# Patient Record
Sex: FEMALE | Race: BLACK OR AFRICAN AMERICAN | NOT HISPANIC OR LATINO | Employment: FULL TIME | URBAN - METROPOLITAN AREA
[De-identification: names, ages, dates, MRNs, and addresses within clinical notes are randomized per-mention and may not be internally consistent; named-entity substitution may affect disease eponyms.]

---

## 2020-02-23 ENCOUNTER — HOSPITAL ENCOUNTER (EMERGENCY)
Facility: HOSPITAL | Age: 40
Discharge: HOME | End: 2020-02-24
Attending: EMERGENCY MEDICINE
Payer: COMMERCIAL

## 2020-02-23 DIAGNOSIS — R10.13 EPIGASTRIC ABDOMINAL PAIN: Primary | ICD-10-CM

## 2020-02-23 DIAGNOSIS — R11.2 NON-INTRACTABLE VOMITING WITH NAUSEA, UNSPECIFIED VOMITING TYPE: ICD-10-CM

## 2020-02-23 LAB
ALBUMIN SERPL BCP-MCNC: 4.7 G/DL (ref 3.5–5.2)
ALP SERPL-CCNC: 68 U/L (ref 55–135)
ALT SERPL W/O P-5'-P-CCNC: 33 U/L (ref 10–44)
ANION GAP SERPL CALC-SCNC: 13 MMOL/L (ref 8–16)
AST SERPL-CCNC: 43 U/L (ref 10–40)
B-HCG UR QL: NEGATIVE
BACTERIA #/AREA URNS HPF: ABNORMAL /HPF
BASOPHILS # BLD AUTO: 0.04 K/UL (ref 0–0.2)
BASOPHILS NFR BLD: 0.2 % (ref 0–1.9)
BILIRUB SERPL-MCNC: 0.4 MG/DL (ref 0.1–1)
BILIRUB UR QL STRIP: NEGATIVE
BUN SERPL-MCNC: 15 MG/DL (ref 6–20)
CALCIUM SERPL-MCNC: 9.4 MG/DL (ref 8.7–10.5)
CHLORIDE SERPL-SCNC: 108 MMOL/L (ref 95–110)
CLARITY UR: CLEAR
CO2 SERPL-SCNC: 17 MMOL/L (ref 23–29)
COLOR UR: YELLOW
CREAT SERPL-MCNC: 1 MG/DL (ref 0.5–1.4)
CTP QC/QA: YES
DIFFERENTIAL METHOD: ABNORMAL
EOSINOPHIL # BLD AUTO: 0 K/UL (ref 0–0.5)
EOSINOPHIL NFR BLD: 0 % (ref 0–8)
ERYTHROCYTE [DISTWIDTH] IN BLOOD BY AUTOMATED COUNT: 13.1 % (ref 11.5–14.5)
EST. GFR  (AFRICAN AMERICAN): >60 ML/MIN/1.73 M^2
EST. GFR  (NON AFRICAN AMERICAN): >60 ML/MIN/1.73 M^2
GLUCOSE SERPL-MCNC: 198 MG/DL (ref 70–110)
GLUCOSE UR QL STRIP: ABNORMAL
HCT VFR BLD AUTO: 40.9 % (ref 37–48.5)
HGB BLD-MCNC: 12.8 G/DL (ref 12–16)
HGB UR QL STRIP: ABNORMAL
HYALINE CASTS #/AREA URNS LPF: 0 /LPF
IMM GRANULOCYTES # BLD AUTO: 0.18 K/UL (ref 0–0.04)
IMM GRANULOCYTES NFR BLD AUTO: 0.8 % (ref 0–0.5)
KETONES UR QL STRIP: ABNORMAL
LEUKOCYTE ESTERASE UR QL STRIP: NEGATIVE
LIPASE SERPL-CCNC: 16 U/L (ref 4–60)
LYMPHOCYTES # BLD AUTO: 1.2 K/UL (ref 1–4.8)
LYMPHOCYTES NFR BLD: 5.4 % (ref 18–48)
MCH RBC QN AUTO: 28.3 PG (ref 27–31)
MCHC RBC AUTO-ENTMCNC: 31.3 G/DL (ref 32–36)
MCV RBC AUTO: 90 FL (ref 82–98)
MICROSCOPIC COMMENT: ABNORMAL
MONOCYTES # BLD AUTO: 0.9 K/UL (ref 0.3–1)
MONOCYTES NFR BLD: 4.3 % (ref 4–15)
NEUTROPHILS # BLD AUTO: 19.4 K/UL (ref 1.8–7.7)
NEUTROPHILS NFR BLD: 89.3 % (ref 38–73)
NITRITE UR QL STRIP: NEGATIVE
NRBC BLD-RTO: 0 /100 WBC
PH UR STRIP: 5 [PH] (ref 5–8)
PLATELET # BLD AUTO: 308 K/UL (ref 150–350)
PMV BLD AUTO: 9.9 FL (ref 9.2–12.9)
POTASSIUM SERPL-SCNC: 4.3 MMOL/L (ref 3.5–5.1)
PROT SERPL-MCNC: 8.7 G/DL (ref 6–8.4)
PROT UR QL STRIP: ABNORMAL
RBC # BLD AUTO: 4.53 M/UL (ref 4–5.4)
RBC #/AREA URNS HPF: 2 /HPF (ref 0–4)
SODIUM SERPL-SCNC: 138 MMOL/L (ref 136–145)
SP GR UR STRIP: 1.02 (ref 1–1.03)
URN SPEC COLLECT METH UR: ABNORMAL
UROBILINOGEN UR STRIP-ACNC: NEGATIVE EU/DL
WBC # BLD AUTO: 21.76 K/UL (ref 3.9–12.7)
WBC #/AREA URNS HPF: 2 /HPF (ref 0–5)

## 2020-02-23 PROCEDURE — 83690 ASSAY OF LIPASE: CPT

## 2020-02-23 PROCEDURE — 85025 COMPLETE CBC W/AUTO DIFF WBC: CPT

## 2020-02-23 PROCEDURE — 99284 EMERGENCY DEPT VISIT MOD MDM: CPT | Mod: 25

## 2020-02-23 PROCEDURE — 81025 URINE PREGNANCY TEST: CPT | Performed by: EMERGENCY MEDICINE

## 2020-02-23 PROCEDURE — 96374 THER/PROPH/DIAG INJ IV PUSH: CPT

## 2020-02-23 PROCEDURE — 81000 URINALYSIS NONAUTO W/SCOPE: CPT

## 2020-02-23 PROCEDURE — 96361 HYDRATE IV INFUSION ADD-ON: CPT

## 2020-02-23 PROCEDURE — 80053 COMPREHEN METABOLIC PANEL: CPT

## 2020-02-23 PROCEDURE — 63600175 PHARM REV CODE 636 W HCPCS: Performed by: NURSE PRACTITIONER

## 2020-02-23 RX ORDER — ONDANSETRON 2 MG/ML
8 INJECTION INTRAMUSCULAR; INTRAVENOUS
Status: COMPLETED | OUTPATIENT
Start: 2020-02-23 | End: 2020-02-23

## 2020-02-23 RX ADMIN — SODIUM CHLORIDE 1000 ML: 0.9 INJECTION, SOLUTION INTRAVENOUS at 11:02

## 2020-02-23 RX ADMIN — ONDANSETRON HYDROCHLORIDE 8 MG: 2 SOLUTION INTRAMUSCULAR; INTRAVENOUS at 11:02

## 2020-02-24 VITALS
WEIGHT: 150 LBS | TEMPERATURE: 98 F | BODY MASS INDEX: 24.11 KG/M2 | HEART RATE: 88 BPM | RESPIRATION RATE: 18 BRPM | DIASTOLIC BLOOD PRESSURE: 74 MMHG | SYSTOLIC BLOOD PRESSURE: 129 MMHG | HEIGHT: 66 IN | OXYGEN SATURATION: 98 %

## 2020-02-24 PROCEDURE — 25500020 PHARM REV CODE 255: Performed by: NURSE PRACTITIONER

## 2020-02-24 PROCEDURE — 63600175 PHARM REV CODE 636 W HCPCS: Performed by: NURSE PRACTITIONER

## 2020-02-24 RX ORDER — PANTOPRAZOLE SODIUM 20 MG/1
20 TABLET, DELAYED RELEASE ORAL DAILY
Qty: 30 TABLET | Refills: 0 | Status: SHIPPED | OUTPATIENT
Start: 2020-02-24 | End: 2021-02-23

## 2020-02-24 RX ORDER — ONDANSETRON 4 MG/1
4 TABLET, ORALLY DISINTEGRATING ORAL EVERY 6 HOURS PRN
Qty: 20 TABLET | Refills: 0 | Status: SHIPPED | OUTPATIENT
Start: 2020-02-24

## 2020-02-24 RX ADMIN — IOHEXOL 80 ML: 350 INJECTION, SOLUTION INTRAVENOUS at 12:02

## 2020-02-24 RX ADMIN — SODIUM CHLORIDE 1000 ML: 0.9 INJECTION, SOLUTION INTRAVENOUS at 12:02

## 2020-02-24 NOTE — ED PROVIDER NOTES
"Encounter Date: 2/23/2020    SCRIBE #1 NOTE: I, Jennifercandice Lemus, am scribing for, and in the presence of,  Fransico Kong NP. I have scribed the following portions of the note - Other sections scribed: HPI, ROS, PE.       History     Chief Complaint   Patient presents with    Vomiting     pt reports drinking alcohol yesterday and now today she has been nauseated & vomiting; pt states "I can't keep anything down"; no meds taken for symptoms    Nausea     Time of initial assessment: 10:49 PM    CC: Emesis    HPI:  This is a 39 y.o. Female, with no known PMHx, who presents to the Emergency Department with a cc of alcohol induced emesis that began this morning upon waking up. Pt reports that she is in town from [place] visiting for [place] and consumed an excessive amount of alcohol last night. Associated symptoms include inability to retain food, abdominal pain, nausea, and diarrhea. Pt says she is unable to retain anything, including water. "Everything makes me feel like I'm going to throw up." She describes her abdominal pain as a soreness that can probably be attributed to abdominal muscle contraction while vomiting. She denies a history of abdominal surgeries. No known allergies. She denies taking any daily medications. No alleviating factors reported. No prior tx.    The history is provided by the patient. No  was used.     Review of patient's allergies indicates:  No Known Allergies  Past Medical History:   Diagnosis Date    Hypertension      Past Surgical History:   Procedure Laterality Date    TUBAL LIGATION       History reviewed. No pertinent family history.  Social History     Tobacco Use    Smoking status: Never Smoker   Substance Use Topics    Alcohol use: Yes     Comment: Occasionally    Drug use: Yes     Types: Marijuana     Review of Systems   Constitutional: Negative for chills and fever.        (+) inability to retain food   HENT: Negative for congestion and sore throat.  "   Respiratory: Negative for shortness of breath.    Cardiovascular: Negative for chest pain.   Gastrointestinal: Positive for abdominal pain, diarrhea, nausea and vomiting.   Genitourinary: Negative for dysuria.   Musculoskeletal: Negative for myalgias.   Skin: Negative for rash.   Neurological: Negative for seizures and syncope.   Hematological: Does not bruise/bleed easily.   Psychiatric/Behavioral: Negative for confusion.       Physical Exam     Initial Vitals [02/23/20 2149]   BP Pulse Resp Temp SpO2   132/79 94 17 98.3 °F (36.8 °C) 98 %      MAP       --         Physical Exam    Nursing note and vitals reviewed.  Constitutional: She appears well-developed and well-nourished. She is not diaphoretic. No distress.   HENT:   Head: Normocephalic and atraumatic.   Right Ear: External ear normal.   Left Ear: External ear normal.   Mouth/Throat: Oropharynx is clear and moist. No oropharyngeal exudate.   Eyes: Conjunctivae and EOM are normal. Right eye exhibits no discharge. Left eye exhibits no discharge.   Neck: Normal range of motion. Neck supple. No tracheal deviation present.   Cardiovascular: Normal rate.   Pulmonary/Chest: No stridor. No respiratory distress.   Abdominal: Soft. She exhibits no distension. There is generalized tenderness. There is no rigidity, no rebound, no guarding, no CVA tenderness, no tenderness at McBurney's point and negative Khalil's sign.   Mild generalized abdominal tenderness.  No focal tenderness.  No rigidity or guarding.   Musculoskeletal: Normal range of motion. She exhibits no tenderness.   Neurological: She is alert and oriented to person, place, and time. No cranial nerve deficit or sensory deficit.   Skin: Skin is warm and dry.   Psychiatric: She has a normal mood and affect. Her behavior is normal. Judgment and thought content normal.         ED Course   Procedures  Labs Reviewed   CBC W/ AUTO DIFFERENTIAL - Abnormal; Notable for the following components:       Result Value     WBC 21.76 (*)     Mean Corpuscular Hemoglobin Conc 31.3 (*)     Immature Granulocytes 0.8 (*)     Gran # (ANC) 19.4 (*)     Immature Grans (Abs) 0.18 (*)     Gran% 89.3 (*)     Lymph% 5.4 (*)     All other components within normal limits   COMPREHENSIVE METABOLIC PANEL - Abnormal; Notable for the following components:    CO2 17 (*)     Glucose 198 (*)     Total Protein 8.7 (*)     AST 43 (*)     All other components within normal limits   URINALYSIS, REFLEX TO URINE CULTURE - Abnormal; Notable for the following components:    Protein, UA 1+ (*)     Glucose, UA 1+ (*)     Ketones, UA 2+ (*)     Occult Blood UA 1+ (*)     All other components within normal limits    Narrative:     Preferred Collection Type->Urine, Clean Catch   URINALYSIS MICROSCOPIC - Abnormal; Notable for the following components:    Bacteria Few (*)     All other components within normal limits    Narrative:     Preferred Collection Type->Urine, Clean Catch   LIPASE   POCT URINE PREGNANCY          Imaging Results          CT Abdomen Pelvis With Contrast (Final result)  Result time 02/24/20 01:11:58    Final result by Sukhwinder Hurd MD (02/24/20 01:11:58)                 Impression:      There is nonspecific mild prominent distention of the stomach without evidence for wall and fold thickening, or inflammatory change, there is also mild prominence of the proximal mid duodenum, note is made of relative diminished space between the superior mesenteric artery and the aorta, this can be seen with SMA syndrome, clinical and historical correlation is otherwise needed.    Prominent appearance of the appendix however without evidence for periappendiceal inflammatory change along its visualized course, this may relate to variant anatomic configuration, however close clinical and historical correlation is otherwise needed.  If there is persistent or worsening symptomatology additional follow-up is recommended.    Prominent appearance of the region of the  lower uterine segment/cervix for which clinical and historical correlation and evaluation is recommended.      Electronically signed by: Sukhwinder Hurd  Date:    02/24/2020  Time:    01:11             Narrative:    EXAMINATION:  CT ABDOMEN PELVIS WITH CONTRAST    CLINICAL HISTORY:  Abdominal pain, unspecified;    TECHNIQUE:  Low dose axial images, sagittal and coronal reformations were obtained from the lung bases to the pubic symphysis following the IV administration of 80 mL of Omnipaque 350 .  Oral contrast was not given.    COMPARISON:  None.    FINDINGS:  Single-phase CT examination of the abdomen and pelvis was performed.  The visualized lung bases demonstrate mild atelectatic change.  There is nonspecific moderate to prominent distention of the stomach with air and fluid there is no evidence for abnormal gastric wall or fold thickening, and no evidence for perigastric inflammatory change, there is mild prominence of the proximal mid duodenum as well, there is mild narrowing of the space between the anterior margin of the aorta and the posterior margin of the superior mesenteric artery, this is somewhat borderline although can be seen with SMA syndrome clinical and historical correlation is otherwise needed.  There is no evidence for small bowel obstructive process.  The appendix is identified, it is difficult to delineate in its entirety, it is somewhat prominent measuring approximately 8-9 mm in caliber, however does not demonstrate inflammatory change.  Close clinical and historical correlation is needed, correlation for appendiceal symptomatology is needed if there is persistent or worsening symptomatology additional follow-up is recommended.  The colon appears decompressed without evidence for inflammatory or obstructive process.  There is no evidence for free intraperitoneal air.    The liver demonstrates mild diminished attenuation this may relate to mild fatty infiltrate.  There is subtle suggestion of  attenuation within the gallbladder this may relate to mild sludge and/or cholelithiasis, there is no evidence for pericholecystic inflammatory change, there is no evidence for peripancreatic inflammatory change, there is no evidence for acute process of the spleen or adrenal glands.  There is no evidence for hydronephrosis or obstructive uropathy or perinephric inflammatory change bilaterally.  The abdominal aorta demonstrates appropriate opacification, the celiac artery, superior mesenteric artery, inferior mesenteric artery and superior mesenteric vein, and renal arteries and veins demonstrate appropriate opacification.  The urinary bladder appears appropriate for degree of distention, mild wall thickening likely relates to incomplete distention.  There is prominent appearance of the region of the lower uterine segment/cervix for which clinical and historical correlation and evaluation is recommended.  There is no evidence for dominant adnexal mass or cystic collection.  There is no evidence for free intraperitoneal air.  The visualized osseous structures appear intact.                                 Medical Decision Making:   History:   Old Medical Records: I decided to obtain old medical records.  Differential Diagnosis:   Hangover effect, appendicitis, pancreatitis, cholecystitis, small-bowel obstruction, intra-abdominal infectious process, others  Clinical Tests:   Lab Tests: Ordered and Reviewed  Radiological Study: Ordered and Reviewed  ED Management:  HPI and physical exam as above.    Patient with generalized abdominal pain and multiple episodes of nausea and vomiting that began when she woke this morning.  She also reports associated diarrhea.  She states that she drink a lot of alcohol yesterday and is attributing her symptoms to that.  Abdominal exam with mild generalized nonfocal tenderness. CBC with leukocytosis an increased ANC.  No bandemia.  CMP with decreased bicarb and mildly elevated AST.   Lipase is unremarkable.  Urinalysis with evidence of dehydration but no convincing evidence of infection.  CT of the abdomen and pelvis is pending.    Lance Lewis dictating 0201: on reevaluation, pt with improved pain, nausea. There is no significant tenderness to abdomen, no local peritoneal signs. She is tolerating PO. CT without evidence of appendicitis, although some abnormal appearance. No RLQ pain or tenderness. I do not suspect appendicitis given hx and exam. There is possibility of SMA syndrome given acute angle of SMA and some proximal intestinal dilation. Pt denies hx of intestinal issues, no hx SMA syndrome. States she had a lot of ETOH yesterday for bday celebration. No fever. No chills, myalgias, recent illness to suggest viral etiology. Given appearance of CT and presentation, advised pt to f/u with a gastroenterologist when she returns to [place] (pt lives in [place]). I have given her strict ED return precautions. She does understand and agree. I feel she can be safely managed as an outpatient with antiemetics. Repeat vitals reassuring.             Scribe Attestation:   Scribe #1: I performed the above scribed service and the documentation accurately describes the services I performed. I attest to the accuracy of the note.                          Clinical Impression:     1. Epigastric abdominal pain    2. Non-intractable vomiting with nausea, unspecified vomiting type        Disposition:   Disposition: Discharged  Condition: Stable                     Lance Lewis PA-C  02/24/20 0207

## 2020-02-24 NOTE — DISCHARGE INSTRUCTIONS
Follow-up with a Gastroenterologist when you return home for evaluation of SMA syndrome. Zofran as needed for nausea. Protonix daily. BRAT diet.     Please present to an emergency department if your pain recurs, if you begin with fever, if any uncontrolled nausea/vomiting, if any other problems occur.